# Patient Record
Sex: MALE | Race: WHITE
[De-identification: names, ages, dates, MRNs, and addresses within clinical notes are randomized per-mention and may not be internally consistent; named-entity substitution may affect disease eponyms.]

---

## 2020-09-23 NOTE — P.HPIHPCON
History of Present Illness


H&P Date: 09/23/20


Chief Complaint: prostate cancer 





Mr Iqbal is 53 yo male with hx of del 7(4+3) prostate cancer he elected to 

proceed with radiation therapy. I discussed with him the option of SpaceOR, I 

discussed the risk and benefit of the procedure. I discussed with him the main 

benefit is to reduce the effect of radiation to the rectum. I discussed risk of 

bleeding and infection. He understood all the risks and agreed to proceed with 

SpaceOR placement  


Consent for Procedure: 


I have explained the operation/procedure to the patient, including the risks, 

benefits, side effects, alternative therapies (including not receiving the 

proposed treatment or service), the likelihood of the patient achieving his/her 

goals, and potential recuperation problems for the procedure/sedation/analgesia,

as well as any blood products, if indicated. I also explained to the patient the

risks, benefits and side effects of the alternatives, as well as the risks 

related to not receiving the proposed procedure, care, treatment, or services.








Surgical - Exam





- General


well developed, well nourished





- Eyes


PERRL, normal ocular movement





- Respiratory


normal expansion, normal respiratory effort





- Psychiatric


oriented to time, oriented to person, oriented to place





Assessment and Plan


Assessment: 





53 yo male with hx of del 7 (4+3) prostate cancer 


-OR for SpaceOR placement

## 2020-09-24 ENCOUNTER — HOSPITAL ENCOUNTER (OUTPATIENT)
Dept: HOSPITAL 47 - OR | Age: 55
Discharge: HOME | End: 2020-09-24
Attending: UROLOGY
Payer: COMMERCIAL

## 2020-09-24 VITALS — RESPIRATION RATE: 16 BRPM

## 2020-09-24 VITALS — HEART RATE: 68 BPM | SYSTOLIC BLOOD PRESSURE: 143 MMHG | DIASTOLIC BLOOD PRESSURE: 87 MMHG

## 2020-09-24 VITALS — BODY MASS INDEX: 39.3 KG/M2

## 2020-09-24 VITALS — TEMPERATURE: 97 F

## 2020-09-24 DIAGNOSIS — Z79.891: ICD-10-CM

## 2020-09-24 DIAGNOSIS — E11.9: ICD-10-CM

## 2020-09-24 DIAGNOSIS — Z79.1: ICD-10-CM

## 2020-09-24 DIAGNOSIS — Z86.69: ICD-10-CM

## 2020-09-24 DIAGNOSIS — Z97.2: ICD-10-CM

## 2020-09-24 DIAGNOSIS — Z79.899: ICD-10-CM

## 2020-09-24 DIAGNOSIS — F32.9: ICD-10-CM

## 2020-09-24 DIAGNOSIS — I10: ICD-10-CM

## 2020-09-24 DIAGNOSIS — K21.9: ICD-10-CM

## 2020-09-24 DIAGNOSIS — E78.5: ICD-10-CM

## 2020-09-24 DIAGNOSIS — C61: Primary | ICD-10-CM

## 2020-09-24 DIAGNOSIS — Z87.891: ICD-10-CM

## 2020-09-24 DIAGNOSIS — Z79.84: ICD-10-CM

## 2020-09-24 LAB
GLUCOSE BLD-MCNC: 138 MG/DL (ref 75–99)
GLUCOSE BLD-MCNC: 152 MG/DL (ref 75–99)

## 2020-09-24 PROCEDURE — 55874 TPRNL PLMT BIODEGRDABL MATRL: CPT

## 2020-09-24 NOTE — P.OP
Date of Procedure: 09/24/20


Preoperative Diagnosis: 


prostate cancer 


Postoperative Diagnosis: 


same 


Procedure(s) Performed: 


SpaceOR placement 


Implants: 


SpaceOR gel 


Anesthesia: CHARLES


Surgeon: Jarod Morel


Estimated Blood Loss (ml): 0


Pathology: none sent


Condition: stable


Disposition: PACU


Indications for Procedure: 


Mr Iqbal is 55 yo male with hx of del 7(4+3) prostate cancer he elected to 

proceed with radiation therapy. I discussed with him the option of SpaceOR, I 

discussed the risk and benefit of the procedure. I discussed with him the main 

benefit is to reduce the effect of radiation to the rectum. I discussed risk of 

bleeding and infection. He understood all the risks and agreed to proceed with 

SpaceOR placement  


Description of Procedure: 


The patient was taken to the operating room and placed in the dorsolithotomy 

position, with his legs supported in Harsh stirrups.  The external genitalia was

prepped and draped sterilely.  The BK transrectal ultrasound probe was placed 

intrarectally.  The prostate was imaged.  The probe was then placed within the 

stabilizing stand.  A spinal needle was advanced under ultrasonic guidance to 

the level of the urogenital diaphragm, and lidocaine was used to infiltrate the 

tissues as the needle was withdrawn.  The fiducial marker were then implanted. 

marker were placed at left mid,left anterior apex and right mid. 





Next, the SpaceOAR needle was passed through the midline of the perineum, 1-2 cm

anterior to the anal opening.  The needle was slowly advanced under ultrasonic 

guidance until the needle tip was located within the fat plane between the 

prostate and rectum, at the level of the mid prostate gland.  The needle was 

confirmed to be midline on the axial imaging.  A small amount of normal saline 

was injected for hydrodissection.  Next, the SpaceOAR components were mixed and 

loaded into the Y connector per protocol.  The Y connector was then connected to

the needle, and the components were injected slowly over a course of 

approximately 12 seconds.  A total of 10 ml was injected.  Significant distance 

was created between the prostate and rectum, as desired.  It should be noted 

that at no point was there any concern of rectal perforation.  The needle was 

withdrawn, as well as the transrectal ultrasound probe, and the procedure was 

terminated.  The patient tolerated the procedure well and was taken to the 

recovery room in stable condition

## 2022-04-14 ENCOUNTER — HOSPITAL ENCOUNTER (OUTPATIENT)
Dept: HOSPITAL 47 - CATHCVL | Age: 57
End: 2022-04-14
Attending: INTERNAL MEDICINE
Payer: COMMERCIAL

## 2022-04-14 VITALS — TEMPERATURE: 97.6 F

## 2022-04-14 VITALS — BODY MASS INDEX: 41.5 KG/M2

## 2022-04-14 VITALS — DIASTOLIC BLOOD PRESSURE: 70 MMHG | RESPIRATION RATE: 14 BRPM | HEART RATE: 67 BPM | SYSTOLIC BLOOD PRESSURE: 108 MMHG

## 2022-04-14 DIAGNOSIS — Z72.0: ICD-10-CM

## 2022-04-14 DIAGNOSIS — Z82.49: ICD-10-CM

## 2022-04-14 DIAGNOSIS — I10: ICD-10-CM

## 2022-04-14 DIAGNOSIS — I25.10: Primary | ICD-10-CM

## 2022-04-14 DIAGNOSIS — E78.5: ICD-10-CM

## 2022-04-14 LAB — GLUCOSE BLD-MCNC: 136 MG/DL (ref 75–99)

## 2022-04-14 PROCEDURE — 93458 L HRT ARTERY/VENTRICLE ANGIO: CPT

## 2022-04-14 RX ADMIN — HEPARIN SODIUM ONE UNIT: 1000 INJECTION, SOLUTION INTRAVENOUS; SUBCUTANEOUS at 07:49

## 2022-04-14 RX ADMIN — HEPARIN SODIUM ONE UNIT: 1000 INJECTION, SOLUTION INTRAVENOUS; SUBCUTANEOUS at 08:36

## 2022-04-14 RX ADMIN — HEPARIN SODIUM ONE UNIT: 1000 INJECTION, SOLUTION INTRAVENOUS; SUBCUTANEOUS at 08:23

## 2022-04-14 NOTE — CC
CARDIAC CATHETERIZATION REPORT



INDICATION:

This is a 56-year-old gentleman who presented to us with chest pain and had a stress

test that showed a partially reversible inferior wall defect. He was advised to undergo

cardiac catheterization for further evaluation.  Patient has been explained risks,

benefits and alternatives, understood and accepted.



PROCEDURE NOTE:

After obtaining informed consent, left heart catheterization and coronary angiogram

were performed via the right radial artery using size 3.5 right and left Ender and a

pigtail catheter.  Patient tolerated the procedure well without any obvious immediate

complications. He received moderate conscious sedation.  Total sedation time was 20

minutes.



We obtained right radial artery access using a micropuncture needle, and catheter and

wire were manipulated into the ascending aorta under fluoroscopic guidance, where the

catheters were exchanged.



FINDINGS:

HEMODYNAMICS: Left ventricular end-diastolic pressure is 8 to 10 mm.  There is no

significant gradient across the aortic valve.



LEFT VENTRICULOGRAM: Left ventriculogram was not performed.



ANGIOGRAPHIC DATA



Right coronary artery. Right coronary artery is a large dominant vessel that is totally

occluded in the proximal part with extensive collaterals to the distal RCA.



Left main coronary artery is a normal-sized vessel and is free of stenosis.  It divides

into left anterior descending coronary artery and circumflex coronary artery. LAD and

its branches are free of significant stenosis. Circumflex coronary artery gives off a

large-caliber OM branch which is free of significant disease. The AV groove circumflex

appears occluded.



CONCLUSIONS:

Severe two-vessel coronary artery disease as described above with a stress test

abnormality in the right coronary artery distribution.



PLAN:

I am going to review the angiographic data with Dr. Chavis, the on-call

interventionist, and decide on catheter-based revascularization of the right coronary

artery.





MMODL / IJN: [id number] / Job#: [id number]

## 2022-04-14 NOTE — P.OP
Description of Procedure: 


PROCEDURES PERFORMED: Right coronary angiography, attempted wiring of RCA





INDICATION: Chest pain with exertion concerning for angina, abnormal stress test





HPI:  Patient is a pleasant 56 year old male who has been having chest pain off 

and on for a few years however felt to be worse recently, fairly consistently 

with exertion.  Patient had diagnostic catheterization which showed RCA  as 

well as subtotalled circumflex which was small caliber.  Given some concern of a

more acute process within the last few weeks with worsened symptoms the RCA 

lesion was felt maybe a softer plaque.  Therefore the decision was made to 

perform wiring and possible PCI.  He has noted blood in his stool as well.  





PROCEDURE: After the risks, benefits and alternatives of the above mentioned 

procedure explained in detail with the patient, informed consent was obtained.  

Patient had already been taken to the catheterization lab and prepped and draped

in usual fashion.  A 6-Setswana sheath had already been placed in the right radial

artery.  





Heparin was given.  Initially attempted a 6Fr AL 0.75 and AL 1.0 however not 

adequately engaged therefore a 6Fr AL 2.0 guide was used to engage the RCA.  

Using a guideliner and a Supercross microcatheter, initially we attempted wiring

with a 0.014 BMW wire then with a 0.014 Fielder XT.  Antegrade approach was not 

proving easily accessible and due to contrast limit, further attempts were 

aborted. 





The right radial sheath was removed and a TR band was placed with hemostasis 

achieved.  The patient tolerated the procedure well.  Patient was transported 

back to the post catheterization holding area in stable condition.





Conscious Sedation: Patient was monitored under the direct supervision of vision

of myself for conscious sedation using Versed and fentanyl for a total duration 

of 30 minutes   








FINAL IMPRESSION: 


1.  RCA  and subototally occluded circumflex


2.  Recent hematochezia





PLAN: 


1.  Aggressive risk factor modification per most recent ACC/AHA guidelines.


2.  Recommend trial of dual antiplatelets to ensure hgb, hematochezia stay 

stable.  If patient remains stable and still has angina would recommend 

reattempt at  PCI RCA.

## 2022-05-18 ENCOUNTER — HOSPITAL ENCOUNTER (OUTPATIENT)
Dept: HOSPITAL 47 - CATHCVL | Age: 57
End: 2022-05-18
Attending: INTERNAL MEDICINE
Payer: COMMERCIAL

## 2022-05-18 VITALS — BODY MASS INDEX: 40.4 KG/M2

## 2022-05-18 DIAGNOSIS — Z53.9: Primary | ICD-10-CM

## 2022-06-22 ENCOUNTER — HOSPITAL ENCOUNTER (OUTPATIENT)
Dept: HOSPITAL 47 - CATHCVL | Age: 57
LOS: 1 days | Discharge: HOME | End: 2022-06-23
Attending: INTERNAL MEDICINE
Payer: COMMERCIAL

## 2022-06-22 VITALS — BODY MASS INDEX: 41.5 KG/M2

## 2022-06-22 DIAGNOSIS — Z98.890: ICD-10-CM

## 2022-06-22 DIAGNOSIS — Z79.82: ICD-10-CM

## 2022-06-22 DIAGNOSIS — I25.82: ICD-10-CM

## 2022-06-22 DIAGNOSIS — Z85.46: ICD-10-CM

## 2022-06-22 DIAGNOSIS — I25.119: ICD-10-CM

## 2022-06-22 DIAGNOSIS — Z87.891: ICD-10-CM

## 2022-06-22 DIAGNOSIS — Z82.49: ICD-10-CM

## 2022-06-22 DIAGNOSIS — I10: ICD-10-CM

## 2022-06-22 DIAGNOSIS — I25.118: Primary | ICD-10-CM

## 2022-06-22 DIAGNOSIS — Z90.49: ICD-10-CM

## 2022-06-22 DIAGNOSIS — Z20.822: ICD-10-CM

## 2022-06-22 DIAGNOSIS — E78.5: ICD-10-CM

## 2022-06-22 DIAGNOSIS — Z82.3: ICD-10-CM

## 2022-06-22 DIAGNOSIS — M19.90: ICD-10-CM

## 2022-06-22 DIAGNOSIS — Z79.84: ICD-10-CM

## 2022-06-22 DIAGNOSIS — Z79.899: ICD-10-CM

## 2022-06-22 DIAGNOSIS — E11.9: ICD-10-CM

## 2022-06-22 LAB
BASOPHILS # BLD AUTO: 0.1 K/UL (ref 0–0.2)
BASOPHILS NFR BLD AUTO: 2 %
EOSINOPHIL # BLD AUTO: 0.2 K/UL (ref 0–0.7)
EOSINOPHIL NFR BLD AUTO: 3 %
ERYTHROCYTE [DISTWIDTH] IN BLOOD BY AUTOMATED COUNT: 4.42 M/UL (ref 4.3–5.9)
ERYTHROCYTE [DISTWIDTH] IN BLOOD: 14.9 % (ref 11.5–15.5)
GLUCOSE BLD-MCNC: 122 MG/DL (ref 70–110)
HCT VFR BLD AUTO: 38.5 % (ref 39–53)
HGB BLD-MCNC: 13 GM/DL (ref 13–17.5)
LYMPHOCYTES # SPEC AUTO: 1 K/UL (ref 1–4.8)
LYMPHOCYTES NFR SPEC AUTO: 19 %
MCH RBC QN AUTO: 29.5 PG (ref 25–35)
MCHC RBC AUTO-ENTMCNC: 33.8 G/DL (ref 31–37)
MCV RBC AUTO: 87.2 FL (ref 80–100)
MONOCYTES # BLD AUTO: 0.4 K/UL (ref 0–1)
MONOCYTES NFR BLD AUTO: 7 %
NEUTROPHILS # BLD AUTO: 3.8 K/UL (ref 1.3–7.7)
NEUTROPHILS NFR BLD AUTO: 68 %
PLATELET # BLD AUTO: 190 K/UL (ref 150–450)
WBC # BLD AUTO: 5.6 K/UL (ref 3.8–10.6)

## 2022-06-22 PROCEDURE — 85025 COMPLETE CBC W/AUTO DIFF WBC: CPT

## 2022-06-22 PROCEDURE — 87635 SARS-COV-2 COVID-19 AMP PRB: CPT

## 2022-06-22 PROCEDURE — 92978 ENDOLUMINL IVUS OCT C 1ST: CPT

## 2022-06-22 PROCEDURE — 82565 ASSAY OF CREATININE: CPT

## 2022-06-22 RX ADMIN — PREGABALIN SCH MG: 100 CAPSULE ORAL at 21:32

## 2022-06-22 RX ADMIN — HEPARIN SODIUM ONE UNIT: 1000 INJECTION, SOLUTION INTRAVENOUS; SUBCUTANEOUS at 15:17

## 2022-06-22 RX ADMIN — HEPARIN SODIUM ONE UNIT: 1000 INJECTION, SOLUTION INTRAVENOUS; SUBCUTANEOUS at 14:24

## 2022-06-22 RX ADMIN — HEPARIN SODIUM ONE UNIT: 1000 INJECTION, SOLUTION INTRAVENOUS; SUBCUTANEOUS at 15:35

## 2022-06-22 RX ADMIN — LIDOCAINE HYDROCHLORIDE ONE ML: 10 INJECTION, SOLUTION EPIDURAL; INFILTRATION; INTRACAUDAL; PERINEURAL at 14:05

## 2022-06-22 RX ADMIN — CEFAZOLIN SCH: 330 INJECTION, POWDER, FOR SOLUTION INTRAMUSCULAR; INTRAVENOUS at 17:46

## 2022-06-22 RX ADMIN — HEPARIN SODIUM ONE UNIT: 1000 INJECTION, SOLUTION INTRAVENOUS; SUBCUTANEOUS at 14:52

## 2022-06-22 RX ADMIN — HEPARIN SODIUM ONE UNIT: 1000 INJECTION, SOLUTION INTRAVENOUS; SUBCUTANEOUS at 14:34

## 2022-06-22 RX ADMIN — HEPARIN SODIUM ONE UNIT: 1000 INJECTION, SOLUTION INTRAVENOUS; SUBCUTANEOUS at 14:13

## 2022-06-22 RX ADMIN — LIDOCAINE HYDROCHLORIDE ONE ML: 10 INJECTION, SOLUTION EPIDURAL; INFILTRATION; INTRACAUDAL; PERINEURAL at 14:12

## 2022-06-22 RX ADMIN — CEFAZOLIN SCH: 330 INJECTION, POWDER, FOR SOLUTION INTRAMUSCULAR; INTRAVENOUS at 23:11

## 2022-06-22 RX ADMIN — CEFAZOLIN SCH MLS: 330 INJECTION, POWDER, FOR SOLUTION INTRAMUSCULAR; INTRAVENOUS at 10:45

## 2022-06-22 RX ADMIN — HEPARIN SODIUM ONE UNIT: 1000 INJECTION, SOLUTION INTRAVENOUS; SUBCUTANEOUS at 15:06

## 2022-06-22 RX ADMIN — LIDOCAINE HYDROCHLORIDE ONE ML: 10 INJECTION, SOLUTION EPIDURAL; INFILTRATION; INTRACAUDAL; PERINEURAL at 14:10

## 2022-06-22 NOTE — P.PRCINT
Percutaneous Coronary Int.





- Percutaneous Coronary Intervention


Percutaneous Coronary Intervention: 





PROCEDURES PERFORMED: Bilateral coronary angiography,  procedure, PCI 

proximal to distal RCA with overlapping 4.0 x 28mm, 4.0 x 38mm and 3.5 x 33mm 

Xience MABEL, post dilated mid portion with a 4.0 NC balloon





INDICATION: CAD, class 3 angina





HISTORY: Patient is a pleasant 56 year old male with history of CAD with 

documentation of  RCA who has been having frequent chest pain even at rest 

and with exertion despite antianginals and therefore was recommended to have 

procedure.  





CONSENT:I have discussed the risks, benefits and alternative therapies for the 

above-mentioned procedure and for both sedation/analgesia as well as necessary 

blood product administration, if indicated, as they pertain to this patient.  

The patient has indicated understanding and acceptance of the risks and 

procedures discussed.





PROCEDURE: After the risks, benefits and alternatives of the above mentioned 

procedure explained in detail with the patient, informed consent was obtained.  

Patient was taken to the catheterization lab and prepped and draped in usual 

fashion.  1% lidocaine was used to anesthetize the right radial artery.  A 6-

Egyptian sheath was placed in the right radial artery using modified Seldinger 

technique.  1% Lidocaine was used to anesthetize the right femoral artery and 

using micropuncture technique and ultrasound guidance a 8-Egyptian sheath was 

placed.  The RCA was initially engaged with a 8-Egyptian AL 2 guide however was 

somewhat 2-D throated and therefore this was changed to a 8-Egyptian AL-1 guide.  

The left coronary artery was engaged with a 6-Egyptian EBU 3.5 guide.  

Simultaneous bilateral injections were performed which did show a long  

segment of approximately 40 mm.  Using a 0.014 Fielder XT in a Corsair 

microcatheter and with guideliner support, the lesion was able to be wired.  The

wire was freely moving and therefore the Corsair was moved distally and there 

was blood return from the Corsair.  Small injection with contrast verified 

intraluminal positioning.  Initially the wire was exchanged for a BMW wire 

however did not have enough support and therefore this was exchanged for a 0.014

GrandSlam wire.   Next balloon angioplasty was performed with a 1.0 and then 1.5

and then 2.0 balloons.  Next IVUS was performed which showed intraluminal 

position and reference vessel 3.5mm distally and 4.0 proximally.  Therefore 

overlapping Xience MABEL stents initially with a 3.5 x 33 mm distally, 4.0 x 38 mm

in the mid segment and 4.0 x 28 mm to the ostium were placed.  The midportion of

the stent was postdilated with a 4.0 noncompliant balloon.  The wire was pulled 

and final angiograms were performed.  Preintervention there was 100% stenosis 

and EVELINA 0 flow and postintervention there was 0% stenosis and EVELINA-3 flow.  





The right radial sheath was removed and a TR band was placed with hemostasis 

achieved.  A right femoral angiogram showed adequate anatomy for closure and 

therefore an 8-Egyptian Angio-Seal was placed.  The patient tolerated the 

procedure well.  Patient was transported back to the post catheterization 

holding area in stable condition.





Conscious Sedation: Patient was monitored under the direct supervision of vision

of myself for conscious sedation using Versed and fentanyl for a total duration 

of 92 minutes   


HEMODYNAMICS: 


Aorta: 132/76





SELECTIVE CORONARY ARTERIOGRAPHY: 


LEFT MAIN: Not imaged, see diagnostic note


LEFT ANTERIOR DESCENDING CORONARY ARTERY: Not imaged, see diagnostic note


LEFT CIRCUMFLEX CORONARY ARTERY: Not imaged, see diagnostic note


RIGHT CORONARY ARTERY: The right coronary artery is a large caliber vessel which

gives off a PDA and PLV branch and is the dominant vessel.  There is 100% 

proximal RCA stenosis.








FINAL IMPRESSION: 


1.   of RCA with 100% stenosis


2.  S/p successful PCI proximal to distal RCA with overlapping 4.0 x 28mm, 4.0 x

38mm and 3.5 x 33mm Xience MABEL, post dilated mid portion with a 4.0 NC balloon





PLAN: 


1.  Aggressive risk factor modification per most recent ACC/AHA guidelines.


2.  Continue dual antiplatelets with aspirin and Plavix for 12 months.

## 2022-06-23 VITALS
TEMPERATURE: 97.9 F | HEART RATE: 67 BPM | DIASTOLIC BLOOD PRESSURE: 61 MMHG | RESPIRATION RATE: 18 BRPM | SYSTOLIC BLOOD PRESSURE: 123 MMHG

## 2022-06-23 RX ADMIN — PREGABALIN SCH MG: 100 CAPSULE ORAL at 08:53

## 2022-06-23 RX ADMIN — CEFAZOLIN SCH: 330 INJECTION, POWDER, FOR SOLUTION INTRAMUSCULAR; INTRAVENOUS at 08:55

## 2022-06-23 RX ADMIN — CEFAZOLIN SCH: 330 INJECTION, POWDER, FOR SOLUTION INTRAMUSCULAR; INTRAVENOUS at 01:49

## 2022-06-23 RX ADMIN — CEFAZOLIN SCH: 330 INJECTION, POWDER, FOR SOLUTION INTRAMUSCULAR; INTRAVENOUS at 06:08

## 2022-06-23 NOTE — P.DS
Providers


Attending physician: 


Roland Chavis DO





Consults: 





                                        





06/22/22 16:20


Consult Physician Routine 


   Consulting Provider: [facility]


   Consult Reason/Comments: Post Interventional Patient


   Do you want consulting provider notified?: Already Contacted











Primary care physician: 


Rylan Hall





[facility] Course: 





The patient is a 56-year-old gentleman who was diagnosed recently with chest 

discomfort and underwent a heart catheterization revealed chronic total 

occlusion of the RCA and LCx.  Maximize medical treatment was tried and the 

patient continues to be symptomatic and in the light of that he was brought to 

the cardiac cath lab yesterday where he underwent successful opening chronic 

total occlusion of the RCA was a placement of 3 drug-eluting stents.


He was seen this morning.  The right groin is soft and nontender with a small 

discrete hematoma.  He has a good right femoral pulse.





The patient is going to be discharged home on dual antiplatelet therapy along 

with high intensity statin and he will follow-up with Dr. Coto as an outpatient

in a week.





Plan - Discharge Summary


Discharge Rx Participant: Yes


New Discharge Prescriptions: 


Continue


   Cinnamon Bark [Cinnamon] 1,000 mg PO DAILY


   Calcium Carbonate [Calcium] 600 mg PO DAILY


   Vitamin E 180 mg PO DAILY


   Multivitamins, Thera [Multivitamin (formulary)] 1 tab PO DAILY


   Omega-3 Fatty Acids/Fish Oil [Fish Oil 1,000 mg Softgel] 1 each PO DAILY


   traMADol HCL [Ultram] 50 mg PO HS


   Meloxicam [Mobic] 7.5 mg PO HS


   Losartan Potassium [Cozaar] 25 mg PO DAILY


   Citalopram Hydrobromide [CeleXA] 40 mg PO DAILY


   Pregabalin 200 mg PO BID


   Isosorbide Mononitrate [Monoket] 30 mg PO DAILY


   atenoloL [Tenormin] 100 mg PO DAILY


   Magnesium Oxide [Mag-Ox] 400 mg PO DAILY


   Omeprazole [PriLOSEC] 40 mg PO DAILY


   Ferrous Sulfate [Feosol] 325 mg PO DAILY


   Aspirin [Adult Low Dose Aspirin EC] 81 mg PO DAILY


   Docusate Sodium [Dok] 100 mg PO DAILY


   Fenofibrate,Micronized [Fenofibrate] 134 mg PO DAILY


   Cholecalciferol [Vitamin D3 (25 Mcg = 1000 Iu)] 25 mcg PO DAILY


   Rosuvastatin Calcium [Crestor] 40 mg PO DAILY


   Clopidogrel [Plavix] 75 mg PO DAILY 60 Days #60 tablet





Discontinued


   metFORMIN HCL [Glucophage] 250 mg PO DAILY


Discharge Medication List





Calcium Carbonate [Calcium] 600 mg PO DAILY 09/23/20 [History]


Cinnamon Bark [Cinnamon] 1,000 mg PO DAILY 09/23/20 [History]


Citalopram Hydrobromide [CeleXA] 40 mg PO DAILY 09/23/20 [History]


Isosorbide Mononitrate [Monoket] 30 mg PO DAILY 09/23/20 [History]


Losartan Potassium [Cozaar] 25 mg PO DAILY 09/23/20 [History]


Magnesium Oxide [Mag-Ox] 400 mg PO DAILY 09/23/20 [History]


Meloxicam [Mobic] 7.5 mg PO HS 09/23/20 [History]


Multivitamins, Thera [Multivitamin (formulary)] 1 tab PO DAILY 09/23/20 

[History]


Omega-3 Fatty Acids/Fish Oil [Fish Oil 1,000 mg Softgel] 1 each PO DAILY 

09/23/20 [History]


Pregabalin 200 mg PO BID 09/23/20 [History]


Vitamin E 180 mg PO DAILY 09/23/20 [History]


atenoloL [Tenormin] 100 mg PO DAILY 09/23/20 [History]


traMADol HCL [Ultram] 50 mg PO HS 09/23/20 [History]


Cholecalciferol [Vitamin D3 (25 Mcg = 1000 Iu)] 25 mcg PO DAILY 04/27/21 

[History]


Rosuvastatin Calcium [Crestor] 40 mg PO DAILY 04/27/21 [History]


Ferrous Sulfate [Feosol] 325 mg PO DAILY 04/13/22 [History]


Omeprazole [PriLOSEC] 40 mg PO DAILY 04/13/22 [History]


Clopidogrel [Plavix] 75 mg PO DAILY 60 Days #60 tablet 04/14/22 [Rx]


Aspirin [Adult Low Dose Aspirin EC] 81 mg PO DAILY 05/16/22 [History]


Docusate Sodium [Dok] 100 mg PO DAILY 05/16/22 [History]


Fenofibrate,Micronized [Fenofibrate] 134 mg PO DAILY 06/21/22 [History]








Follow up Appointment(s)/Referral(s): 


Rigoberto Coto MD [STAFF PHYSICIAN] - 06/30/22 3:15 pm (Dr. Coto will not be 

in [place] for [facility], so your appt is @ [facility].)